# Patient Record
Sex: FEMALE | Race: BLACK OR AFRICAN AMERICAN
[De-identification: names, ages, dates, MRNs, and addresses within clinical notes are randomized per-mention and may not be internally consistent; named-entity substitution may affect disease eponyms.]

---

## 2018-12-15 ENCOUNTER — HOSPITAL ENCOUNTER (EMERGENCY)
Dept: HOSPITAL 72 - EMR | Age: 53
Discharge: HOME | End: 2018-12-15
Payer: MEDICAID

## 2018-12-15 VITALS — DIASTOLIC BLOOD PRESSURE: 84 MMHG | SYSTOLIC BLOOD PRESSURE: 143 MMHG

## 2018-12-15 VITALS — BODY MASS INDEX: 21.26 KG/M2 | HEIGHT: 63 IN | WEIGHT: 120 LBS

## 2018-12-15 DIAGNOSIS — I10: ICD-10-CM

## 2018-12-15 DIAGNOSIS — F41.9: ICD-10-CM
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
## 2018-12-15 DIAGNOSIS — S89.92XA: Primary | ICD-10-CM

## 2018-12-15 PROCEDURE — 29505 APPLICATION LONG LEG SPLINT: CPT

## 2018-12-15 PROCEDURE — 99283 EMERGENCY DEPT VISIT LOW MDM: CPT

## 2018-12-15 NOTE — EMERGENCY ROOM REPORT
History of Present Illness


General


Chief Complaint:  Lower Extremity Injury


Source:  Patient





Present Illness


HPI


53-year-old female presents ED for evaluation.  States that she slipped going 

down the stairs and twisted her knee.  Occurred around midnight.  Denies 

hitting her head or LOC.  Coming in with pain to her left knee.  Throbbing, 10 

out of 10, nonradiating.  Unable to bear weight.  States it feels unsteady.  

Denies any other injuries.  No other aggravating relieving factors.  Denies any 

other associated symptoms


Allergies:  


Coded Allergies:  


     NO KNOWN ALLERGIES (Verified  Allergy, Unknown, 18)


Uncoded Allergies:  


     unknown cause of itching (Allergy, Unknown, 14)


 Pt complains of itching but cause unknown





Patient History


Past Medical History:  CVA/TIA, psych hx


Past Surgical History:  none


Pertinent Family History:  none


Social History:  Denies: smoking, alcohol use, drug use


Last Menstrual Period:  n/a


Pregnant Now:  No


:  1


Para:  1


Immunizations:  UTD


Reviewed Nursing Documentation:  PMH: Agreed; PSxH: Agreed





Nursing Documentation-PMH


Past Medical History:  No History, Except For


Hx Cardiac Problems:  Yes


Hx Hypertension:  Yes


Hx Asthma:  Yes


Hx Cancer:  No


Hx Gastrointestinal Problems:  No


Hx Neurological Problems:  Yes - anxiety


Hx Cerebrovascular Accident:  Yes - brain aneurysm; clipped in 


Hx Headaches:  Yes


Hx Neurologic Surgery:  Yes - brain anuerysm clip 





Review of Systems


All Other Systems:  negative except mentioned in HPI





Physical Exam





Vital Signs








  Date Time  Temp Pulse Resp B/P (MAP) Pulse Ox O2 Delivery O2 Flow Rate FiO2


 


12/15/18 00:14 97.7 91 18 151/87 96 Room Air  








Sp02 EP Interpretation:  reviewed, normal


General Appearance:  no apparent distress, alert, GCS 15, non-toxic


Head:  normocephalic


Eyes:  bilateral eye normal inspection, bilateral eye PERRL


ENT:  normal ENT inspection


Neck:  normal inspection


Respiratory:  normal inspection


Cardiovascular #1:  normal inspection


Gastrointestinal:  normal inspection


Rectal:  deferred


Genitourinary:  no CVA tenderness


Musculoskeletal:  tender - L knee - joint laxity noted


Neurologic:  alert, oriented x3, responsive, motor strength/tone normal, 

sensory intact, speech normal


Psychiatric:  normal inspection


Skin:  normal inspection


Lymphatic:  normal inspection





Procedures


Splinting


Splinting :  


   Consent:  Verbal


   Pre-Made Type:  knee immobilizer


   Pre-Proc Neuro Vasc Exam:  normal


   Post-Proc Neuro Vasc Exam:  normal


   Patient Tolerated:  Well


   Complications:  None





Medical Decision Making


Diagnostic Impression:  


 Primary Impression:  


 Knee injury


 Qualified Codes:  S89.92XA - Unspecified injury of left lower leg, initial 

encounter


ER Course


Hospital Course 


53-year-old F presents to ED complaining of L knee pain s/p trip and fall 





Differential diagnoses include: Fracture, dislocation, sprain, contusion





Clinical course


Patient placed on stretcher.  After initial history and physical, I ordered 

pain medications and Xrays of L knee





Xrays prelim read shows no acute fracture/dislocation.  


Consideration for ligament strain/tear.  Discussed with patient.  Given 

referrals.  Recommend followup with orthopedics.  Placed in knee immobilizer 

and given crutches





Diagnosis - knee injury





Stable and discharged to home with prescription for Motrin, Norco.  apply ice, 

keep elevated.  weight bear as tolerated.  Followup with PMD/ortho.  Return to 

ED if symptoms recur or worsen


Other X-Ray Diagnostic Results


Other X-Ray Diagnostic Results :  


   X-Ray ordered:  L knee


   # of Views/Limited Vs Complete:  3 View


   Indication:  Pain


   EP Interpretation:  Yes


   Interpretation:  no dislocation, no soft tissue swelling, no fractures


   Impression:  No acute disease


   Electronically Signed by:  Electronically signed by Kenyon Horta MD





Last Vital Signs








  Date Time  Temp Pulse Resp B/P (MAP) Pulse Ox O2 Delivery O2 Flow Rate FiO2


 


12/15/18 01:17 97.7       


 


12/15/18 00:14  91 18 151/87 96 Room Air  








Status:  improved


Disposition:  HOME, SELF-CARE


Condition:  Stable


Scripts


Hydrocodone Bit/Acetaminophen 5-325* (NORCO 5-325*) 1 Each Tablet


1 TAB ORAL Q6H PRN for For Pain, #10 TAB 0 Refills


   Prov: Kenyon Horta MD         12/15/18 


Ibuprofen* (MOTRIN*) 600 Mg Tablet


600 MG ORAL Q8H PRN for For Pain, #30 TAB 0 Refills


   Prov: Kenyon Horta MD         12/15/18


Referrals:  


[facility]











[facility]


Patient Instructions:  Knee Sprain, Easy-to-Read











Kenyon Horta MD Dec 15, 2018 02:20

## 2018-12-15 NOTE — DIAGNOSTIC IMAGING REPORT
EXAM:

  XR Left Knee, 3 views

 

CLINICAL HISTORY:

  PAIN

 

TECHNIQUE:

  Three views of the left knee.

 

COMPARISON:

  No relevant prior studies available.

 

FINDINGS:

  Bones/joints:  Unremarkable.  No acute fracture.  No dislocation.

  Soft tissues:  Unremarkable.

 

IMPRESSION:     

  Normal left knee x-rays.

## 2020-12-16 ENCOUNTER — HOSPITAL ENCOUNTER (EMERGENCY)
Dept: HOSPITAL 72 - EMR | Age: 55
Discharge: LEFT BEFORE DISCHARGE | End: 2020-12-16
Payer: MEDICAID

## 2020-12-16 VITALS — SYSTOLIC BLOOD PRESSURE: 158 MMHG | DIASTOLIC BLOOD PRESSURE: 92 MMHG

## 2020-12-16 VITALS — WEIGHT: 130 LBS | HEIGHT: 63 IN | BODY MASS INDEX: 23.04 KG/M2

## 2020-12-16 VITALS — DIASTOLIC BLOOD PRESSURE: 92 MMHG | SYSTOLIC BLOOD PRESSURE: 158 MMHG

## 2020-12-16 DIAGNOSIS — Z86.79: ICD-10-CM

## 2020-12-16 DIAGNOSIS — I11.9: ICD-10-CM

## 2020-12-16 DIAGNOSIS — Z53.21: ICD-10-CM

## 2020-12-16 DIAGNOSIS — R52: Primary | ICD-10-CM

## 2020-12-16 NOTE — EMERGENCY ROOM REPORT
History of Present Illness


General


Chief Complaint:  Pain


Source:  Medical Record





Present Illness


Allergies:  


Coded Allergies:  


     NO KNOWN ALLERGIES (Verified  Allergy, Unknown, 4/28/18)


Uncoded Allergies:  


     unknown cause of itching (Allergy, Unknown, 5/31/14)


   Pt complains of itching but cause unknown





COVID-19 Screening


Contact w/high risk pt:  No


Experienced COVID-19 symptoms?:  No


COVID-19 Testing performed PTA:  Yes


COVID-19 Screening:  Negative COVID-19


COVID-19 Testing Source:  2 months ago





Nursing Documentation-[facility]


Past Medical History:  No History, Except For


Hx Cardiac Problems:  Yes


Hx Hypertension:  Yes


Hx Asthma:  Yes


Hx Cancer:  No


Hx Gastrointestinal Problems:  No


Hx Neurological Problems:  Yes - anxiety


Hx Cerebrovascular Accident:  Yes - brain aneurysm; clipped in 2009


Hx Headaches:  Yes


Hx Neurologic Surgery:  Yes - brain anuerysm clip 2009





Physical Exam





Vital Signs








  Date Time  Temp Pulse Resp B/P (MAP) Pulse Ox O2 Delivery O2 Flow Rate FiO2


 


12/16/20 20:01 98.1 108 16 158/92 (114) 98 Room Air  











Medical Decision Making


Diagnostic Impression:  


   Primary Impression:  


   Patient left without being seen


ER Course


This patient left before I was able to see her.  She did not want to wait per 

the RN caring for the patient.





Last Vital Signs








  Date Time  Temp Pulse Resp B/P (MAP) Pulse Ox O2 Delivery O2 Flow Rate FiO2


 


12/16/20 20:25 98.1 108 16 158/92 98 Room Air  








Disposition:  LEFT W/OUT BEING SEEN


Condition:  Stable











Susanne Dean DO            Dec 16, 2020 20:33

## 2020-12-16 NOTE — NUR
ED Nurse Note:



Patient walked into ED for c/o L thigh pain that radiates to her back. She 
states she fell approximately a week ago and pain has not resolved. She is able 
to ambulate with steady gait at this time, but walking exacerbates the pain. 
Patient is aaox4, breathing is normal and unlabored.

## 2020-12-16 NOTE — NUR
ED Nurse Note:



Patient left without being seen by FAROOQ Dean at this time. FAROOQ made aware. 
Patient stated "I can't wait any longer to see a doctor, I'm in too much pain". 
ID band removed. Patient aaox4, breathing is normal and unlabored and she 
ambulated out of ED with steady gait.